# Patient Record
Sex: MALE | URBAN - METROPOLITAN AREA
[De-identification: names, ages, dates, MRNs, and addresses within clinical notes are randomized per-mention and may not be internally consistent; named-entity substitution may affect disease eponyms.]

---

## 2022-11-09 ENCOUNTER — APPOINTMENT (OUTPATIENT)
Dept: URBAN - METROPOLITAN AREA CLINIC 214 | Age: 42
Setting detail: DERMATOLOGY
End: 2022-11-09

## 2022-11-09 DIAGNOSIS — D49.2 NEOPLASM OF UNSPECIFIED BEHAVIOR OF BONE, SOFT TISSUE, AND SKIN: ICD-10-CM

## 2022-11-09 PROCEDURE — 11307 SHAVE SKIN LESION 1.1-2.0 CM: CPT

## 2022-11-09 PROCEDURE — OTHER SHAVE REMOVAL: OTHER

## 2022-11-09 PROCEDURE — OTHER MIPS QUALITY: OTHER

## 2022-11-09 ASSESSMENT — LOCATION SIMPLE DESCRIPTION DERM: LOCATION SIMPLE: RIGHT ANTERIOR NECK

## 2022-11-09 ASSESSMENT — LOCATION ZONE DERM: LOCATION ZONE: NECK

## 2022-11-09 ASSESSMENT — LOCATION DETAILED DESCRIPTION DERM: LOCATION DETAILED: RIGHT SUPERIOR ANTERIOR NECK

## 2022-11-09 NOTE — PROCEDURE: SHAVE REMOVAL
Post-Care Instructions: I reviewed with the patient in detail post-care instructions. Patient is to keep the biopsy site dry overnight, and then apply aquaphor or vaseline daily with a wound dressing.

## 2022-11-09 NOTE — PROCEDURE: SHAVE REMOVAL
Medical Necessity Clause: This procedure was medically necessary because the lesion that was treated was

## 2023-02-09 NOTE — PROCEDURE: SHAVE REMOVAL
Detail Level: Detailed Detail Level: Detailed Additional Notes: Patient has not received flu vaccine due to personal reasons Quality 110: Preventive Care And Screening: Influenza Immunization: Influenza Immunization not Administered for Documented Reasons.